# Patient Record
Sex: FEMALE | Race: WHITE | NOT HISPANIC OR LATINO | URBAN - METROPOLITAN AREA
[De-identification: names, ages, dates, MRNs, and addresses within clinical notes are randomized per-mention and may not be internally consistent; named-entity substitution may affect disease eponyms.]

---

## 2017-02-08 ENCOUNTER — EMERGENCY (EMERGENCY)
Facility: HOSPITAL | Age: 42
LOS: 1 days | End: 2017-02-08
Payer: MEDICAID

## 2017-02-08 PROBLEM — Z00.00 ENCOUNTER FOR PREVENTIVE HEALTH EXAMINATION: Status: ACTIVE | Noted: 2017-02-08

## 2017-02-08 PROCEDURE — 99285 EMERGENCY DEPT VISIT HI MDM: CPT

## 2017-02-08 PROCEDURE — 70450 CT HEAD/BRAIN W/O DYE: CPT | Mod: 26

## 2018-09-10 ENCOUNTER — RX RENEWAL (OUTPATIENT)
Age: 43
End: 2018-09-10

## 2018-09-27 ENCOUNTER — RX RENEWAL (OUTPATIENT)
Age: 43
End: 2018-09-27

## 2018-09-30 ENCOUNTER — RX RENEWAL (OUTPATIENT)
Age: 43
End: 2018-09-30

## 2019-01-16 ENCOUNTER — OTHER (OUTPATIENT)
Age: 44
End: 2019-01-16

## 2019-01-22 ENCOUNTER — RX CHANGE (OUTPATIENT)
Age: 44
End: 2019-01-22

## 2019-01-22 ENCOUNTER — MEDICATION RENEWAL (OUTPATIENT)
Age: 44
End: 2019-01-22

## 2019-03-28 ENCOUNTER — RECORD ABSTRACTING (OUTPATIENT)
Age: 44
End: 2019-03-28

## 2019-03-28 DIAGNOSIS — Z86.39 PERSONAL HISTORY OF OTHER ENDOCRINE, NUTRITIONAL AND METABOLIC DISEASE: ICD-10-CM

## 2019-03-28 DIAGNOSIS — Z80.9 FAMILY HISTORY OF MALIGNANT NEOPLASM, UNSPECIFIED: ICD-10-CM

## 2019-03-28 DIAGNOSIS — Z78.9 OTHER SPECIFIED HEALTH STATUS: ICD-10-CM

## 2019-03-28 DIAGNOSIS — Z83.3 FAMILY HISTORY OF DIABETES MELLITUS: ICD-10-CM

## 2019-03-28 RX ORDER — ALPRAZOLAM 0.5 MG/1
0.5 TABLET ORAL
Refills: 0 | Status: ACTIVE | COMMUNITY

## 2019-03-28 RX ORDER — OXYCODONE HYDROCHLORIDE AND ACETAMINOPHEN 5; 325 MG/1; MG/1
5-325 TABLET ORAL
Refills: 0 | Status: ACTIVE | COMMUNITY

## 2019-03-29 ENCOUNTER — APPOINTMENT (OUTPATIENT)
Dept: ENDOCRINOLOGY | Facility: CLINIC | Age: 44
End: 2019-03-29
Payer: MEDICAID

## 2019-03-29 VITALS
WEIGHT: 293 LBS | HEIGHT: 68 IN | OXYGEN SATURATION: 98 % | BODY MASS INDEX: 44.41 KG/M2 | DIASTOLIC BLOOD PRESSURE: 70 MMHG | SYSTOLIC BLOOD PRESSURE: 118 MMHG | HEART RATE: 89 BPM

## 2019-03-29 LAB — GLUCOSE BLDC GLUCOMTR-MCNC: 162

## 2019-03-29 PROCEDURE — 82962 GLUCOSE BLOOD TEST: CPT

## 2019-03-29 PROCEDURE — 99214 OFFICE O/P EST MOD 30 MIN: CPT | Mod: 25

## 2019-03-29 RX ORDER — INSULIN GLARGINE 300 U/ML
300 INJECTION, SOLUTION SUBCUTANEOUS
Qty: 3 | Refills: 3 | Status: DISCONTINUED | COMMUNITY
Start: 2018-09-30 | End: 2019-03-29

## 2019-03-29 RX ORDER — INSULIN DEGLUDEC INJECTION 100 U/ML
100 INJECTION, SOLUTION SUBCUTANEOUS
Qty: 2 | Refills: 2 | Status: DISCONTINUED | COMMUNITY
End: 2019-03-29

## 2019-03-29 NOTE — ASSESSMENT
[FreeTextEntry1] : DM type 1, unclear level of control. ADvised using dexcom to verify; will also update labs. Switch to basaglar per insurance change\par hyperlipidemia, on statin therapy\par hypertension controlled

## 2019-03-30 ENCOUNTER — RESULT REVIEW (OUTPATIENT)
Age: 44
End: 2019-03-30

## 2019-03-30 LAB
ALBUMIN SERPL ELPH-MCNC: 3.9 G/DL
ALP BLD-CCNC: 109 U/L
ALT SERPL-CCNC: 25 U/L
ANION GAP SERPL CALC-SCNC: 18 MMOL/L
AST SERPL-CCNC: 18 U/L
BASOPHILS # BLD AUTO: 0.03 K/UL
BASOPHILS NFR BLD AUTO: 0.5 %
BILIRUB SERPL-MCNC: 0.2 MG/DL
BUN SERPL-MCNC: 14 MG/DL
CALCIUM SERPL-MCNC: 9.5 MG/DL
CHLORIDE SERPL-SCNC: 104 MMOL/L
CHOLEST SERPL-MCNC: 183 MG/DL
CHOLEST/HDLC SERPL: 2.7 RATIO
CO2 SERPL-SCNC: 22 MMOL/L
CREAT SERPL-MCNC: 0.82 MG/DL
EOSINOPHIL # BLD AUTO: 0.14 K/UL
EOSINOPHIL NFR BLD AUTO: 2.1 %
ESTIMATED AVERAGE GLUCOSE: 203 MG/DL
GLUCOSE SERPL-MCNC: 132 MG/DL
HBA1C MFR BLD HPLC: 8.7 %
HCT VFR BLD CALC: 44 %
HDLC SERPL-MCNC: 67 MG/DL
HGB BLD-MCNC: 13.5 G/DL
IMM GRANULOCYTES NFR BLD AUTO: 0.2 %
LDLC SERPL CALC-MCNC: 97 MG/DL
LYMPHOCYTES # BLD AUTO: 2.01 K/UL
LYMPHOCYTES NFR BLD AUTO: 30.8 %
MAN DIFF?: NORMAL
MCHC RBC-ENTMCNC: 28.1 PG
MCHC RBC-ENTMCNC: 30.7 GM/DL
MCV RBC AUTO: 91.7 FL
MONOCYTES # BLD AUTO: 0.43 K/UL
MONOCYTES NFR BLD AUTO: 6.6 %
NEUTROPHILS # BLD AUTO: 3.91 K/UL
NEUTROPHILS NFR BLD AUTO: 59.8 %
PLATELET # BLD AUTO: 374 K/UL
POTASSIUM SERPL-SCNC: 4.4 MMOL/L
PROT SERPL-MCNC: 7.1 G/DL
RBC # BLD: 4.8 M/UL
RBC # FLD: 13.3 %
SODIUM SERPL-SCNC: 144 MMOL/L
TRIGL SERPL-MCNC: 93 MG/DL
TSH SERPL-ACNC: 1.26 UIU/ML
WBC # FLD AUTO: 6.53 K/UL

## 2019-08-09 ENCOUNTER — OTHER (OUTPATIENT)
Age: 44
End: 2019-08-09

## 2019-08-12 ENCOUNTER — APPOINTMENT (OUTPATIENT)
Dept: ENDOCRINOLOGY | Facility: CLINIC | Age: 44
End: 2019-08-12
Payer: MEDICAID

## 2019-08-12 PROCEDURE — 36415 COLL VENOUS BLD VENIPUNCTURE: CPT

## 2019-08-13 LAB
ALBUMIN SERPL ELPH-MCNC: 3.8 G/DL
ALP BLD-CCNC: 117 U/L
ALT SERPL-CCNC: 17 U/L
ANION GAP SERPL CALC-SCNC: 15 MMOL/L
APPEARANCE: CLEAR
AST SERPL-CCNC: 19 U/L
BASOPHILS # BLD AUTO: 0.02 K/UL
BASOPHILS NFR BLD AUTO: 0.3 %
BILIRUB SERPL-MCNC: 0.3 MG/DL
BILIRUBIN URINE: NEGATIVE
BLOOD URINE: NEGATIVE
BUN SERPL-MCNC: 11 MG/DL
CALCIUM SERPL-MCNC: 8.7 MG/DL
CHLORIDE SERPL-SCNC: 102 MMOL/L
CHOLEST SERPL-MCNC: 209 MG/DL
CHOLEST/HDLC SERPL: 3.7 RATIO
CO2 SERPL-SCNC: 22 MMOL/L
COLOR: NORMAL
CREAT SERPL-MCNC: 0.66 MG/DL
CREAT SPEC-SCNC: 111 MG/DL
EOSINOPHIL # BLD AUTO: 0.17 K/UL
EOSINOPHIL NFR BLD AUTO: 2.6 %
ESTIMATED AVERAGE GLUCOSE: 183 MG/DL
GLUCOSE QUALITATIVE U: ABNORMAL
GLUCOSE SERPL-MCNC: 143 MG/DL
HBA1C MFR BLD HPLC: 8 %
HCT VFR BLD CALC: 39.9 %
HDLC SERPL-MCNC: 56 MG/DL
HGB BLD-MCNC: 12.6 G/DL
IMM GRANULOCYTES NFR BLD AUTO: 0.2 %
KETONES URINE: NEGATIVE
LDLC SERPL CALC-MCNC: 126 MG/DL
LEUKOCYTE ESTERASE URINE: NEGATIVE
LYMPHOCYTES # BLD AUTO: 2.05 K/UL
LYMPHOCYTES NFR BLD AUTO: 31.6 %
MAN DIFF?: NORMAL
MCHC RBC-ENTMCNC: 28.6 PG
MCHC RBC-ENTMCNC: 31.6 GM/DL
MCV RBC AUTO: 90.7 FL
MICROALBUMIN 24H UR DL<=1MG/L-MCNC: <1.2 MG/DL
MICROALBUMIN/CREAT 24H UR-RTO: NORMAL MG/G
MONOCYTES # BLD AUTO: 0.48 K/UL
MONOCYTES NFR BLD AUTO: 7.4 %
NEUTROPHILS # BLD AUTO: 3.76 K/UL
NEUTROPHILS NFR BLD AUTO: 57.9 %
NITRITE URINE: NEGATIVE
PH URINE: 6
PLATELET # BLD AUTO: 344 K/UL
POTASSIUM SERPL-SCNC: 4.2 MMOL/L
PROT SERPL-MCNC: 6.8 G/DL
PROTEIN URINE: NEGATIVE
RBC # BLD: 4.4 M/UL
RBC # FLD: 12.5 %
SODIUM SERPL-SCNC: 139 MMOL/L
SPECIFIC GRAVITY URINE: 1.02
TRIGL SERPL-MCNC: 133 MG/DL
TSH SERPL-ACNC: 2.42 UIU/ML
UROBILINOGEN URINE: NORMAL
WBC # FLD AUTO: 6.49 K/UL

## 2019-08-15 ENCOUNTER — OTHER (OUTPATIENT)
Age: 44
End: 2019-08-15

## 2019-08-16 ENCOUNTER — APPOINTMENT (OUTPATIENT)
Dept: ENDOCRINOLOGY | Facility: CLINIC | Age: 44
End: 2019-08-16
Payer: MEDICAID

## 2019-08-16 PROCEDURE — G0108 DIAB MANAGE TRN  PER INDIV: CPT

## 2019-08-16 RX ORDER — INSULIN LISPRO 100 [IU]/ML
100 INJECTION, SOLUTION INTRAVENOUS; SUBCUTANEOUS
Refills: 0 | Status: DISCONTINUED | COMMUNITY
End: 2019-08-16

## 2019-08-28 ENCOUNTER — APPOINTMENT (OUTPATIENT)
Dept: ENDOCRINOLOGY | Facility: CLINIC | Age: 44
End: 2019-08-28
Payer: MEDICAID

## 2019-08-28 VITALS
HEIGHT: 68 IN | DIASTOLIC BLOOD PRESSURE: 70 MMHG | WEIGHT: 293 LBS | SYSTOLIC BLOOD PRESSURE: 122 MMHG | BODY MASS INDEX: 44.41 KG/M2 | HEART RATE: 95 BPM

## 2019-08-28 PROCEDURE — 99214 OFFICE O/P EST MOD 30 MIN: CPT

## 2019-08-28 NOTE — HISTORY OF PRESENT ILLNESS
[FreeTextEntry1] : h/o diabetes presenting at age 17 with polyuria and polydipsia. ALways insulin treated. Overweight at onset, with no h/o DKA. Progressive weight gain.\par ISABELLA positive, c-peptide negative\par has dexcom, not used recently\par basaglar 30-35 units\par admelog variable dosing 100 units/day\par uses vials\par Lap band ineffective and was removed\par victoza ineffective\par qsymia not covered by insurance\par \par brother and sister diabetic with onset in teenage years, not obese.\par \par PMH:\par small incidental thyroid nodule\par \par scheduled for liposuction 9/12

## 2019-08-28 NOTE — ASSESSMENT
[FreeTextEntry1] : DM type 1, improving control. Rise in glucose sometimes many hours after carb ingestion; improving with decreaed carb intake, all verified by dexcom download.\par hyperlipidemia, on statin therapy\par hypertension controlled\par \par In terms of surgery, this is to certify that I am the physician treating pt's diabetes, and she is medically clear for surgery.

## 2019-09-19 ENCOUNTER — EMERGENCY (EMERGENCY)
Facility: HOSPITAL | Age: 44
LOS: 1 days | End: 2019-09-19
Admitting: EMERGENCY MEDICINE
Payer: MEDICAID

## 2019-09-19 PROCEDURE — 99283 EMERGENCY DEPT VISIT LOW MDM: CPT

## 2019-09-19 PROCEDURE — 74019 RADEX ABDOMEN 2 VIEWS: CPT | Mod: 26

## 2019-10-03 ENCOUNTER — RX RENEWAL (OUTPATIENT)
Age: 44
End: 2019-10-03

## 2019-10-08 ENCOUNTER — APPOINTMENT (OUTPATIENT)
Dept: OPHTHALMOLOGY | Facility: CLINIC | Age: 44
End: 2019-10-08

## 2019-10-24 ENCOUNTER — APPOINTMENT (OUTPATIENT)
Dept: OPHTHALMOLOGY | Facility: CLINIC | Age: 44
End: 2019-10-24

## 2019-11-12 ENCOUNTER — APPOINTMENT (OUTPATIENT)
Dept: ENDOCRINOLOGY | Facility: CLINIC | Age: 44
End: 2019-11-12
Payer: MEDICAID

## 2019-11-12 PROCEDURE — 36415 COLL VENOUS BLD VENIPUNCTURE: CPT

## 2019-11-13 LAB
ALBUMIN SERPL ELPH-MCNC: 3.6 G/DL
ALP BLD-CCNC: 97 U/L
ALT SERPL-CCNC: 13 U/L
ANION GAP SERPL CALC-SCNC: 11 MMOL/L
AST SERPL-CCNC: 17 U/L
BASOPHILS # BLD AUTO: 0.03 K/UL
BASOPHILS NFR BLD AUTO: 0.4 %
BILIRUB SERPL-MCNC: 0.2 MG/DL
BUN SERPL-MCNC: 14 MG/DL
CALCIUM SERPL-MCNC: 8.7 MG/DL
CHLORIDE SERPL-SCNC: 104 MMOL/L
CHOLEST SERPL-MCNC: 200 MG/DL
CHOLEST/HDLC SERPL: 3.3 RATIO
CO2 SERPL-SCNC: 25 MMOL/L
CREAT SERPL-MCNC: 0.69 MG/DL
CREAT SPEC-SCNC: 139 MG/DL
EOSINOPHIL # BLD AUTO: 0.25 K/UL
EOSINOPHIL NFR BLD AUTO: 3.5 %
ESTIMATED AVERAGE GLUCOSE: 163 MG/DL
GLUCOSE SERPL-MCNC: 123 MG/DL
HBA1C MFR BLD HPLC: 7.3 %
HCT VFR BLD CALC: 36.7 %
HDLC SERPL-MCNC: 61 MG/DL
HGB BLD-MCNC: 10.6 G/DL
IMM GRANULOCYTES NFR BLD AUTO: 0.1 %
LDLC SERPL CALC-MCNC: 112 MG/DL
LYMPHOCYTES # BLD AUTO: 1.88 K/UL
LYMPHOCYTES NFR BLD AUTO: 26.5 %
MAN DIFF?: NORMAL
MCHC RBC-ENTMCNC: 25.4 PG
MCHC RBC-ENTMCNC: 28.9 GM/DL
MCV RBC AUTO: 88 FL
MICROALBUMIN 24H UR DL<=1MG/L-MCNC: <1.2 MG/DL
MICROALBUMIN/CREAT 24H UR-RTO: NORMAL MG/G
MONOCYTES # BLD AUTO: 0.64 K/UL
MONOCYTES NFR BLD AUTO: 9 %
NEUTROPHILS # BLD AUTO: 4.28 K/UL
NEUTROPHILS NFR BLD AUTO: 60.5 %
PLATELET # BLD AUTO: 430 K/UL
POTASSIUM SERPL-SCNC: 4.5 MMOL/L
PROT SERPL-MCNC: 6.5 G/DL
RBC # BLD: 4.17 M/UL
RBC # FLD: 13.9 %
SODIUM SERPL-SCNC: 140 MMOL/L
TRIGL SERPL-MCNC: 137 MG/DL
TSH SERPL-ACNC: 1.37 UIU/ML
WBC # FLD AUTO: 7.09 K/UL

## 2019-11-14 ENCOUNTER — APPOINTMENT (OUTPATIENT)
Dept: ENDOCRINOLOGY | Facility: CLINIC | Age: 44
End: 2019-11-14
Payer: COMMERCIAL

## 2019-11-14 VITALS
DIASTOLIC BLOOD PRESSURE: 80 MMHG | WEIGHT: 293 LBS | SYSTOLIC BLOOD PRESSURE: 124 MMHG | BODY MASS INDEX: 44.41 KG/M2 | HEART RATE: 92 BPM | HEIGHT: 68 IN | OXYGEN SATURATION: 98 %

## 2019-11-14 LAB — GLUCOSE BLDC GLUCOMTR-MCNC: 178

## 2019-11-14 PROCEDURE — 99214 OFFICE O/P EST MOD 30 MIN: CPT | Mod: 25

## 2019-11-14 PROCEDURE — 82962 GLUCOSE BLOOD TEST: CPT

## 2019-11-14 NOTE — PHYSICAL EXAM
[Well Developed] : well developed [Clear to Auscultation] : lungs were clear to auscultation bilaterally [Regular Rhythm] : with a regular rhythm

## 2019-11-14 NOTE — HISTORY OF PRESENT ILLNESS
[FreeTextEntry1] : h/o diabetes presenting at age 17 with polyuria and polydipsia. ALways insulin treated. Overweight at onset, with no h/o DKA. Progressive weight gain.\par ISABELLA positive, c-peptide negative\par has dexcom, not used recently\par basaglar 30-35 units\par admelog variable dosing 100 units/day\par uses vials\par Lap band ineffective and was removed\par victoza ineffective\par qsymia not covered by insurance\par \par brother and sister diabetic with onset in teenage years, not obese.\par \par PMH:\par small incidental thyroid nodule\par \par s/p liposuction

## 2019-11-14 NOTE — ASSESSMENT
[FreeTextEntry1] : DM type one, overall controlled but with erratic overnight highs.. Unclear if this is due to post-supper snack or due to a requirement for more basal insulin. Pt. will provide more data and logs to review.\par \par Medically clear for surgery

## 2020-03-17 ENCOUNTER — APPOINTMENT (OUTPATIENT)
Dept: ENDOCRINOLOGY | Facility: CLINIC | Age: 45
End: 2020-03-17

## 2020-03-20 ENCOUNTER — APPOINTMENT (OUTPATIENT)
Dept: ENDOCRINOLOGY | Facility: CLINIC | Age: 45
End: 2020-03-20

## 2020-04-03 ENCOUNTER — APPOINTMENT (OUTPATIENT)
Dept: ENDOCRINOLOGY | Facility: CLINIC | Age: 45
End: 2020-04-03

## 2020-05-05 ENCOUNTER — APPOINTMENT (OUTPATIENT)
Dept: ENDOCRINOLOGY | Facility: CLINIC | Age: 45
End: 2020-05-05
Payer: MEDICAID

## 2020-05-05 PROCEDURE — 36415 COLL VENOUS BLD VENIPUNCTURE: CPT

## 2020-05-06 LAB
ALBUMIN SERPL ELPH-MCNC: 3.9 G/DL
ALP BLD-CCNC: 131 U/L
ALT SERPL-CCNC: 16 U/L
ANION GAP SERPL CALC-SCNC: 12 MMOL/L
AST SERPL-CCNC: 13 U/L
BASOPHILS # BLD AUTO: 0.04 K/UL
BASOPHILS NFR BLD AUTO: 0.6 %
BILIRUB SERPL-MCNC: 0.3 MG/DL
BUN SERPL-MCNC: 11 MG/DL
CALCIUM SERPL-MCNC: 9.2 MG/DL
CHLORIDE SERPL-SCNC: 101 MMOL/L
CHOLEST SERPL-MCNC: 205 MG/DL
CHOLEST/HDLC SERPL: 3 RATIO
CO2 SERPL-SCNC: 26 MMOL/L
CREAT SERPL-MCNC: 0.65 MG/DL
CREAT SPEC-SCNC: 274 MG/DL
EOSINOPHIL # BLD AUTO: 0.15 K/UL
EOSINOPHIL NFR BLD AUTO: 2.1 %
ESTIMATED AVERAGE GLUCOSE: 206 MG/DL
GLUCOSE SERPL-MCNC: 85 MG/DL
HBA1C MFR BLD HPLC: 8.8 %
HCT VFR BLD CALC: 39.2 %
HDLC SERPL-MCNC: 68 MG/DL
HGB BLD-MCNC: 12 G/DL
IMM GRANULOCYTES NFR BLD AUTO: 0.1 %
LDLC SERPL CALC-MCNC: 109 MG/DL
LYMPHOCYTES # BLD AUTO: 2.34 K/UL
LYMPHOCYTES NFR BLD AUTO: 32.2 %
MAN DIFF?: NORMAL
MCHC RBC-ENTMCNC: 25.8 PG
MCHC RBC-ENTMCNC: 30.6 GM/DL
MCV RBC AUTO: 84.3 FL
MICROALBUMIN 24H UR DL<=1MG/L-MCNC: 1.3 MG/DL
MICROALBUMIN/CREAT 24H UR-RTO: 5 MG/G
MONOCYTES # BLD AUTO: 0.7 K/UL
MONOCYTES NFR BLD AUTO: 9.6 %
NEUTROPHILS # BLD AUTO: 4.02 K/UL
NEUTROPHILS NFR BLD AUTO: 55.4 %
PLATELET # BLD AUTO: 423 K/UL
POTASSIUM SERPL-SCNC: 4.1 MMOL/L
PROT SERPL-MCNC: 7.2 G/DL
RBC # BLD: 4.65 M/UL
RBC # FLD: 17.3 %
SODIUM SERPL-SCNC: 138 MMOL/L
TRIGL SERPL-MCNC: 143 MG/DL
TSH SERPL-ACNC: 1.83 UIU/ML
WBC # FLD AUTO: 7.26 K/UL

## 2020-05-08 ENCOUNTER — APPOINTMENT (OUTPATIENT)
Dept: ENDOCRINOLOGY | Facility: CLINIC | Age: 45
End: 2020-05-08
Payer: MEDICAID

## 2020-05-08 PROCEDURE — 99214 OFFICE O/P EST MOD 30 MIN: CPT | Mod: 95

## 2020-05-08 NOTE — HISTORY OF PRESENT ILLNESS
[Home] : at home, [unfilled] , at the time of the visit. [Medical Office: ([facility])___] : at the medical office located in  [Patient] : the patient [FreeTextEntry1] : h/o diabetes presenting at age 17 with polyuria and polydipsia. ALways insulin treated. Overweight at onset, with no h/o DKA. Progressive weight gain.\par ISABELLA positive, c-peptide negative\par has dexcom, not used recently\par basaglar 30-35 units\par admelog variable dosing 100 units/day\par uses vials\par Lap band ineffective and was removed\par victoza ineffective\par qsymia not covered by insurance\par \par brother and sister diabetic with onset in teenage years, not obese.\par \par PMH:\par small incidental thyroid nodule\par \par s/p liposuction

## 2020-05-08 NOTE — ASSESSMENT
[FreeTextEntry1] : DM type 1, loss of control. Advised resumption of dexcom use, to be followed by review of data.\par Hyperlipidemia, on therapy\par \par meds renewed

## 2020-05-08 NOTE — PHYSICAL EXAM
[Alert] : alert [No Acute Distress] : no acute distress [Normal Affect] : the affect was normal [Normal Mood] : the mood was normal

## 2020-08-20 ENCOUNTER — RX RENEWAL (OUTPATIENT)
Age: 45
End: 2020-08-20

## 2020-09-21 ENCOUNTER — APPOINTMENT (OUTPATIENT)
Dept: ENDOCRINOLOGY | Facility: CLINIC | Age: 45
End: 2020-09-21
Payer: MEDICAID

## 2020-09-21 PROCEDURE — 36415 COLL VENOUS BLD VENIPUNCTURE: CPT

## 2020-09-22 LAB
ALBUMIN SERPL ELPH-MCNC: 4 G/DL
ALP BLD-CCNC: 143 U/L
ALT SERPL-CCNC: 18 U/L
ANION GAP SERPL CALC-SCNC: 11 MMOL/L
AST SERPL-CCNC: 18 U/L
BASOPHILS # BLD AUTO: 0.04 K/UL
BASOPHILS NFR BLD AUTO: 0.7 %
BILIRUB SERPL-MCNC: 0.2 MG/DL
BUN SERPL-MCNC: 9 MG/DL
CALCIUM SERPL-MCNC: 9 MG/DL
CHLORIDE SERPL-SCNC: 99 MMOL/L
CHOLEST SERPL-MCNC: 208 MG/DL
CHOLEST/HDLC SERPL: 3.3 RATIO
CO2 SERPL-SCNC: 26 MMOL/L
CREAT SERPL-MCNC: 0.65 MG/DL
CREAT SPEC-SCNC: 115 MG/DL
EOSINOPHIL # BLD AUTO: 0.17 K/UL
EOSINOPHIL NFR BLD AUTO: 2.9 %
ESTIMATED AVERAGE GLUCOSE: 197 MG/DL
GLUCOSE SERPL-MCNC: 155 MG/DL
HBA1C MFR BLD HPLC: 8.5 %
HCT VFR BLD CALC: 39.9 %
HDLC SERPL-MCNC: 63 MG/DL
HGB BLD-MCNC: 12.1 G/DL
IMM GRANULOCYTES NFR BLD AUTO: 0.2 %
LDLC SERPL CALC-MCNC: 121 MG/DL
LYMPHOCYTES # BLD AUTO: 1.87 K/UL
LYMPHOCYTES NFR BLD AUTO: 32 %
MAN DIFF?: NORMAL
MCHC RBC-ENTMCNC: 27.4 PG
MCHC RBC-ENTMCNC: 30.3 GM/DL
MCV RBC AUTO: 90.5 FL
MICROALBUMIN 24H UR DL<=1MG/L-MCNC: <1.2 MG/DL
MICROALBUMIN/CREAT 24H UR-RTO: NORMAL MG/G
MONOCYTES # BLD AUTO: 0.49 K/UL
MONOCYTES NFR BLD AUTO: 8.4 %
NEUTROPHILS # BLD AUTO: 3.27 K/UL
NEUTROPHILS NFR BLD AUTO: 55.8 %
PLATELET # BLD AUTO: 353 K/UL
POTASSIUM SERPL-SCNC: 4.2 MMOL/L
PROT SERPL-MCNC: 6.8 G/DL
RBC # BLD: 4.41 M/UL
RBC # FLD: 14.4 %
SODIUM SERPL-SCNC: 137 MMOL/L
TRIGL SERPL-MCNC: 120 MG/DL
TSH SERPL-ACNC: 0.84 UIU/ML
WBC # FLD AUTO: 5.85 K/UL

## 2020-09-25 ENCOUNTER — APPOINTMENT (OUTPATIENT)
Dept: ENDOCRINOLOGY | Facility: CLINIC | Age: 45
End: 2020-09-25
Payer: MEDICAID

## 2020-09-25 VITALS
BODY MASS INDEX: 44.41 KG/M2 | WEIGHT: 293 LBS | HEART RATE: 81 BPM | HEIGHT: 68 IN | DIASTOLIC BLOOD PRESSURE: 80 MMHG | SYSTOLIC BLOOD PRESSURE: 132 MMHG

## 2020-09-25 LAB — GLUCOSE BLDC GLUCOMTR-MCNC: 155

## 2020-09-25 PROCEDURE — 82962 GLUCOSE BLOOD TEST: CPT

## 2020-09-25 PROCEDURE — 99215 OFFICE O/P EST HI 40 MIN: CPT | Mod: 25

## 2020-09-25 NOTE — HISTORY OF PRESENT ILLNESS
[FreeTextEntry1] : h/o diabetes presenting at age 17 with polyuria and polydipsia. ALways insulin treated. Overweight at onset, with no h/o DKA. Progressive weight gain.\par ISABELLA positive, c-peptide negative\par has dexcom, not used recently\par basaglar 35 units\par admelog variable dosing 100 units/day 1:10 CF\par uses vials\par Lap band ineffective and was removed\par victoza ineffective\par qsymia not covered by insurance\par \par brother and sister diabetic with onset in teenage years, not obese.\par \par PMH:\par small incidental thyroid nodule\par \par s/p liposuction

## 2020-09-25 NOTE — ASSESSMENT
[FreeTextEntry1] : DM type 1, suboptimal control. needs to resume using dexcom. Counseled on pump options. Pt. has difficulty regulating overnight hyperglycemia and raising her basal insulin tends to cause daytime hypoglycemia; would benefit from automated insulin delivery. \par Will follow up with CDE.\par \par counseling and discussion of pump advantages and disadvantages, and review of lab data 25/40 minutes

## 2020-11-13 ENCOUNTER — APPOINTMENT (OUTPATIENT)
Dept: ENDOCRINOLOGY | Facility: CLINIC | Age: 45
End: 2020-11-13

## 2021-01-05 ENCOUNTER — APPOINTMENT (OUTPATIENT)
Dept: ENDOCRINOLOGY | Facility: CLINIC | Age: 46
End: 2021-01-05

## 2021-01-15 ENCOUNTER — APPOINTMENT (OUTPATIENT)
Dept: ENDOCRINOLOGY | Facility: CLINIC | Age: 46
End: 2021-01-15

## 2021-03-29 ENCOUNTER — APPOINTMENT (OUTPATIENT)
Dept: ENDOCRINOLOGY | Facility: CLINIC | Age: 46
End: 2021-03-29
Payer: MEDICAID

## 2021-03-29 VITALS
WEIGHT: 293 LBS | OXYGEN SATURATION: 97 % | HEIGHT: 68 IN | HEART RATE: 97 BPM | SYSTOLIC BLOOD PRESSURE: 130 MMHG | DIASTOLIC BLOOD PRESSURE: 80 MMHG | BODY MASS INDEX: 44.41 KG/M2

## 2021-03-29 LAB — GLUCOSE BLDC GLUCOMTR-MCNC: 157

## 2021-03-29 PROCEDURE — 99072 ADDL SUPL MATRL&STAF TM PHE: CPT

## 2021-03-29 PROCEDURE — 82962 GLUCOSE BLOOD TEST: CPT

## 2021-03-29 PROCEDURE — 99214 OFFICE O/P EST MOD 30 MIN: CPT | Mod: 25

## 2021-03-29 RX ORDER — BLOOD-GLUCOSE TRANSMITTER
EACH MISCELLANEOUS
Qty: 1 | Refills: 3 | Status: ACTIVE | COMMUNITY
Start: 2019-08-16 | End: 1900-01-01

## 2021-03-29 RX ORDER — BLOOD-GLUCOSE SENSOR
EACH MISCELLANEOUS
Qty: 3 | Refills: 3 | Status: ACTIVE | COMMUNITY
Start: 2019-08-16 | End: 1900-01-01

## 2021-03-29 NOTE — HISTORY OF PRESENT ILLNESS
[FreeTextEntry1] : h/o diabetes presenting at age 17 with polyuria and polydipsia. ALways insulin treated. Overweight at onset, with no h/o DKA. Progressive weight gain.\par ISABELLA positive, c-peptide negative\par has dexcom, not used recently\par semglee 35 units\par humalog variable dosing 100 units/day 1:10 CF\par uses vials\par Lap band ineffective and was removed\par victoza ineffective\par qsymia not covered by insurance\par \par brother and sister diabetic with onset in teenage years, not obese.\par \par PMH:\par small incidental thyroid nodule\par \par s/p liposuction

## 2021-03-29 NOTE — ASSESSMENT
[FreeTextEntry1] : DM type 1, suboptimal control. needs to resume using dexcom.\par discussed hyperlipiemia\par update labs\par renew dexcom\par \par Glucose Sensor Necessity:  This patient with diabetes (dx: E10.65 ) has been performing 4 glucose checks per day for the last 60 days utilizing a home blood glucose monitor  The patient is treated with insulin via 4 injections daily.  This patient requires frequent adjustments to her insulin treatment on the basis of therapeutic continuous glucose monitoring results by either adjusting fixed doses or sliding scale.  In addition, the patient has been to our office for an evaluation of their diabetes control within the past 6 months.  \par \par \par

## 2021-07-21 ENCOUNTER — RX RENEWAL (OUTPATIENT)
Age: 46
End: 2021-07-21

## 2021-10-02 ENCOUNTER — RX RENEWAL (OUTPATIENT)
Age: 46
End: 2021-10-02

## 2021-10-15 ENCOUNTER — RX RENEWAL (OUTPATIENT)
Age: 46
End: 2021-10-15

## 2021-10-15 RX ORDER — BLOOD SUGAR DIAGNOSTIC
STRIP MISCELLANEOUS
Qty: 600 | Refills: 4 | Status: ACTIVE | COMMUNITY
Start: 2020-08-20 | End: 1900-01-01

## 2022-01-16 ENCOUNTER — RX RENEWAL (OUTPATIENT)
Age: 47
End: 2022-01-16

## 2022-01-26 ENCOUNTER — TRANSCRIPTION ENCOUNTER (OUTPATIENT)
Age: 47
End: 2022-01-26

## 2022-01-27 ENCOUNTER — TRANSCRIPTION ENCOUNTER (OUTPATIENT)
Age: 47
End: 2022-01-27

## 2022-01-28 ENCOUNTER — TRANSCRIPTION ENCOUNTER (OUTPATIENT)
Age: 47
End: 2022-01-28

## 2022-04-20 ENCOUNTER — RX RENEWAL (OUTPATIENT)
Age: 47
End: 2022-04-20

## 2022-07-13 LAB
HBA1C MFR BLD HPLC: 9.3
LDLC SERPL DIRECT ASSAY-MCNC: 123
MICROALBUMIN/CREAT 24H UR-RTO: NORMAL

## 2022-07-14 ENCOUNTER — APPOINTMENT (OUTPATIENT)
Dept: ENDOCRINOLOGY | Facility: CLINIC | Age: 47
End: 2022-07-14

## 2022-07-14 VITALS
BODY MASS INDEX: 44.41 KG/M2 | HEART RATE: 117 BPM | WEIGHT: 293 LBS | HEIGHT: 68 IN | OXYGEN SATURATION: 97 % | DIASTOLIC BLOOD PRESSURE: 80 MMHG | SYSTOLIC BLOOD PRESSURE: 120 MMHG

## 2022-07-14 LAB — GLUCOSE BLDC GLUCOMTR-MCNC: 262

## 2022-07-14 PROCEDURE — 82962 GLUCOSE BLOOD TEST: CPT

## 2022-07-14 PROCEDURE — 99214 OFFICE O/P EST MOD 30 MIN: CPT | Mod: 25

## 2022-07-14 NOTE — ASSESSMENT
[FreeTextEntry1] : DM type 1, suboptimal control. needs to resume using dexcom. Empirically increase semglee to 45 units due to reports of am hyperglycemia\par hyperlipidemia controlled\par \par \par Glucose Sensor Necessity:  This patient with diabetes (dx: E10.65 ) has been performing 4 glucose checks per day for the last 60 days utilizing a home blood glucose monitor  The patient is treated with insulin via 4 injections daily.  This patient requires frequent adjustments to her insulin treatment on the basis of therapeutic continuous glucose monitoring results by either adjusting fixed doses or sliding scale.  In addition, the patient has been to our office for an evaluation of their diabetes control within the past 6 months.  \par \par \par

## 2022-07-14 NOTE — HISTORY OF PRESENT ILLNESS
[FreeTextEntry1] : h/o diabetes presenting at age 17 with polyuria and polydipsia. ALways insulin treated. Overweight at onset, with no h/o DKA. Progressive weight gain.\par ISABELLA positive, c-peptide negative\par has dexcom, not used recently\par semglee 35 units\par humalog variable dosing 100 units/day 1:10 CF\par uses vials\par Lap band ineffective and was removed\par victoza ineffective\par qsymia not covered by insurance\par \par brother and sister diabetic with onset in teenage years, not obese.\par \par PMH:\par small incidental thyroid nodule\par elevated alkaline phos., improving\par \par s/p liposuction

## 2022-08-09 ENCOUNTER — APPOINTMENT (OUTPATIENT)
Dept: ENDOCRINOLOGY | Facility: CLINIC | Age: 47
End: 2022-08-09

## 2022-11-08 ENCOUNTER — NON-APPOINTMENT (OUTPATIENT)
Age: 47
End: 2022-11-08

## 2022-12-19 ENCOUNTER — APPOINTMENT (OUTPATIENT)
Dept: ENDOCRINOLOGY | Facility: CLINIC | Age: 47
End: 2022-12-19

## 2022-12-20 ENCOUNTER — NON-APPOINTMENT (OUTPATIENT)
Age: 47
End: 2022-12-20

## 2023-04-03 ENCOUNTER — RX RENEWAL (OUTPATIENT)
Age: 48
End: 2023-04-03

## 2023-05-02 ENCOUNTER — RX RENEWAL (OUTPATIENT)
Age: 48
End: 2023-05-02

## 2023-05-02 RX ORDER — BLOOD SUGAR DIAGNOSTIC
STRIP MISCELLANEOUS
Qty: 600 | Refills: 7 | Status: ACTIVE | COMMUNITY
Start: 2022-07-14 | End: 1900-01-01

## 2023-06-09 RX ORDER — INSULIN LISPRO 100 U/ML
100 INJECTION, SOLUTION INTRAVENOUS; SUBCUTANEOUS
Qty: 30 | Refills: 6 | Status: DISCONTINUED | COMMUNITY
Start: 2022-01-16 | End: 2023-06-09

## 2023-06-21 RX ORDER — INSULIN LISPRO 100 [IU]/ML
100 INJECTION, SOLUTION INTRAVENOUS; SUBCUTANEOUS
Qty: 90 | Refills: 3 | Status: ACTIVE | COMMUNITY
Start: 2023-06-21

## 2023-08-17 ENCOUNTER — APPOINTMENT (OUTPATIENT)
Dept: ENDOCRINOLOGY | Facility: CLINIC | Age: 48
End: 2023-08-17
Payer: MEDICAID

## 2023-08-17 VITALS
DIASTOLIC BLOOD PRESSURE: 80 MMHG | HEART RATE: 113 BPM | OXYGEN SATURATION: 98 % | HEIGHT: 68 IN | SYSTOLIC BLOOD PRESSURE: 130 MMHG

## 2023-08-17 LAB — GLUCOSE BLDC GLUCOMTR-MCNC: 220

## 2023-08-17 PROCEDURE — 82962 GLUCOSE BLOOD TEST: CPT

## 2023-08-17 PROCEDURE — 99214 OFFICE O/P EST MOD 30 MIN: CPT | Mod: 25

## 2023-08-17 RX ORDER — INSULIN LISPRO 100 [IU]/ML
100 INJECTION, SOLUTION INTRAVENOUS; SUBCUTANEOUS
Qty: 60 | Refills: 3 | Status: DISCONTINUED | COMMUNITY
Start: 2019-10-03 | End: 2023-08-17

## 2023-08-17 RX ORDER — DULAGLUTIDE 0.75 MG/.5ML
0.75 INJECTION, SOLUTION SUBCUTANEOUS
Qty: 3 | Refills: 1 | Status: DISCONTINUED | COMMUNITY
Start: 2022-09-27 | End: 2023-08-17

## 2023-08-17 RX ORDER — INSULIN LISPRO 100 [IU]/ML
100 INJECTION, SOLUTION INTRAVENOUS; SUBCUTANEOUS
Qty: 30 | Refills: 5 | Status: DISCONTINUED | COMMUNITY
Start: 2022-07-14 | End: 2023-08-17

## 2023-08-17 RX ORDER — INSULIN LISPRO 100 [IU]/ML
100 INJECTION, SOLUTION INTRAVENOUS; SUBCUTANEOUS
Qty: 10 | Refills: 1 | Status: ACTIVE | COMMUNITY
Start: 2023-07-19 | End: 1900-01-01

## 2023-08-17 RX ORDER — BLOOD SUGAR DIAGNOSTIC
STRIP MISCELLANEOUS
Qty: 300 | Refills: 3 | Status: ACTIVE | COMMUNITY
Start: 2018-09-30 | End: 1900-01-01

## 2023-08-17 RX ORDER — SYRINGE-NEEDLE,INSULIN,0.5 ML 31 GX5/16"
31G X 5/16" SYRINGE, EMPTY DISPOSABLE MISCELLANEOUS
Qty: 60 | Refills: 3 | Status: ACTIVE | COMMUNITY
Start: 2019-03-29 | End: 1900-01-01

## 2023-08-17 RX ORDER — PEN NEEDLE, DIABETIC 29 G X1/2"
32G X 4 MM NEEDLE, DISPOSABLE MISCELLANEOUS
Qty: 100 | Refills: 3 | Status: ACTIVE | COMMUNITY
Start: 2020-05-08 | End: 1900-01-01

## 2023-08-17 RX ORDER — INSULIN GLARGINE-YFGN 100 [IU]/ML
100 INJECTION, SOLUTION SUBCUTANEOUS
Qty: 3 | Refills: 3 | Status: DISCONTINUED | COMMUNITY
Start: 2022-07-14 | End: 2023-08-17

## 2023-08-17 NOTE — HISTORY OF PRESENT ILLNESS
[FreeTextEntry1] : Pt lost to follow up since 2022  h/o diabetes presenting at age 17 with polyuria and polydipsia. ALways insulin treated. Overweight at onset, with no h/o DKA. Progressive weight gain.  ISABELLA positive, c-peptide negative  has dexcom, not used recently Checks sugars 6x a day On average 100s-200s, rare 300s Denies hypoglycemia   Current regimen:  Basaglar 60 units in PM (pen) humalog variable dosing 100 units/day 1:10 CF (vials)  Admelog does not work well for pt  Lap band ineffective and was removed Had a tummy tuck  victoza ineffective qsymia not covered by insurance on ozempic for several weeks- stopped peeing and then stopped taking ozempic and her urinary frequency resumed  brother and sister diabetic with onset in teenage years, not obese.  PMH:  small incidental thyroid nodule  On statin and zetia - followed by cardiology

## 2023-08-17 NOTE — REVIEW OF SYSTEMS
[Fatigue] : no fatigue [Visual Field Defect] : no visual field defect [Blurred Vision] : no blurred vision [Dysphagia] : no dysphagia [Neck Pain] : no neck pain [Dysphonia] : no dysphonia [Chest Pain] : no chest pain [Shortness Of Breath] : no shortness of breath [Constipation] : no constipation [Diarrhea] : no diarrhea [Polyuria] : no polyuria [Polydipsia] : no polydipsia

## 2023-08-17 NOTE — ASSESSMENT
[FreeTextEntry1] : T1DM -Need updated HGA1C -Pt to resume dexcom- will see if g7 is covered, if no, she will resume g6 -Continue current insulin dosing -Continue to watch diet and increase exercise as tolerated, goal of 30 mins a day 5x a week -Continue to follow with ophtho   HLD -Continue statin and zetia, need updated lipid panel -Follows with cardiology  Need updated thyroid sonogram, rx givne  Labs due now RTO 12/2023 with Dr. Collado

## 2023-08-22 RX ORDER — INSULIN GLARGINE 100 [IU]/ML
100 INJECTION, SOLUTION SUBCUTANEOUS DAILY
Qty: 8 | Refills: 2 | Status: DISCONTINUED | COMMUNITY
Start: 2019-03-29 | End: 2023-08-22

## 2023-09-20 ENCOUNTER — NON-APPOINTMENT (OUTPATIENT)
Age: 48
End: 2023-09-20

## 2023-09-20 ENCOUNTER — APPOINTMENT (OUTPATIENT)
Dept: OPHTHALMOLOGY | Facility: CLINIC | Age: 48
End: 2023-09-20
Payer: MEDICAID

## 2023-09-20 PROCEDURE — 99204 OFFICE O/P NEW MOD 45 MIN: CPT

## 2023-11-21 RX ORDER — INSULIN GLARGINE-YFGN 100 [IU]/ML
100 INJECTION, SOLUTION SUBCUTANEOUS
Qty: 6 | Refills: 0 | Status: DISCONTINUED | COMMUNITY
Start: 2023-08-22 | End: 2023-11-21

## 2023-11-27 RX ORDER — INSULIN GLARGINE 100 [IU]/ML
100 INJECTION, SOLUTION SUBCUTANEOUS DAILY
Qty: 6 | Refills: 1 | Status: ACTIVE | COMMUNITY
Start: 2023-11-21 | End: 1900-01-01

## 2024-01-29 ENCOUNTER — RX RENEWAL (OUTPATIENT)
Age: 49
End: 2024-01-29

## 2024-01-31 ENCOUNTER — APPOINTMENT (OUTPATIENT)
Dept: ENDOCRINOLOGY | Facility: CLINIC | Age: 49
End: 2024-01-31
Payer: MEDICAID

## 2024-01-31 DIAGNOSIS — E10.65 TYPE 1 DIABETES MELLITUS WITH HYPERGLYCEMIA: ICD-10-CM

## 2024-01-31 PROCEDURE — 99214 OFFICE O/P EST MOD 30 MIN: CPT

## 2024-01-31 RX ORDER — LISINOPRIL 10 MG/1
10 TABLET ORAL
Qty: 90 | Refills: 1 | Status: ACTIVE | COMMUNITY
Start: 2018-09-27 | End: 1900-01-01

## 2024-01-31 RX ORDER — EZETIMIBE 10 MG/1
10 TABLET ORAL
Qty: 90 | Refills: 1 | Status: ACTIVE | COMMUNITY
Start: 1900-01-01 | End: 1900-01-01

## 2024-01-31 RX ORDER — SEMAGLUTIDE 1.34 MG/ML
2 INJECTION, SOLUTION SUBCUTANEOUS
Qty: 3 | Refills: 1 | Status: DISCONTINUED | COMMUNITY
Start: 2022-09-22 | End: 2024-01-31

## 2024-01-31 NOTE — ASSESSMENT
[FreeTextEntry1] : T1DM -Need updated [id number] -Pt to resume dexcom- will work on getting her 90 day supplies but still, needs to place dexcom -Continue current insulin dosing- have no data to make any safe/effective changes  -Continue to watch diet and increase exercise as tolerated, goal of 30 mins a day 5x a week -Continue to follow with ophtho   HLD -Continue statin and zetia, need updated lipid panel -Follows with cardiology  Need updated thyroid sonogram, rx given (overdue)  Labs due now RTO 5-6 weeks

## 2024-01-31 NOTE — REVIEW OF SYSTEMS
[Fatigue] : no fatigue [Recent Weight Gain (___ Lbs)] : no recent weight gain [Recent Weight Loss (___ Lbs)] : no recent weight loss [Visual Field Defect] : no visual field defect [Blurred Vision] : no blurred vision [Dysphagia] : no dysphagia [Neck Pain] : no neck pain [Dysphonia] : no dysphonia [Chest Pain] : no chest pain [Shortness Of Breath] : no shortness of breath [Constipation] : no constipation [Diarrhea] : no diarrhea [Polyuria] : no polyuria [Back Pain] : back pain [Polydipsia] : no polydipsia

## 2024-01-31 NOTE — HISTORY OF PRESENT ILLNESS
[Home] : at home, [unfilled] , at the time of the visit. [Other Location: e.g. Home (Enter Location, City,State)___] : at [unfilled] [FreeTextEntry1] : Interval history: Tried intermittent fasting last week- gained a lb Has back spasms- required pain meds - feels she gains weight anytime she take pain meds  OVERDUE FOR LABS AND THYROID SONO  T1DM h/o diabetes presenting at age 17 with polyuria and polydipsia. always insulin treated. Overweight at onset, with no h/o DKA. Progressive weight gain.  ISABELLA positive, c-peptide negative  has dexcom g7, not used recently - frustrating to get refills from [facility]  Checks sugars 6x a day On average 100s-200s, rare 300s Denies hypoglycemia   Current regimen:  Basaglar 60 units in PM (pen) humalog variable dosing 100 units/day 1:10 CF (vials)  Admelog does not work well for pt  Last eye exam: Fall 2023- denies DR  Glucose Sensor Necessity:  This patient with diabetes performs 6 or more glucose checks per day utilizing a home blood glucose monitor.  The patient is treated with insulin via 4 or more injections daily. This patient requires frequent adjustments to their insulin treatment on the basis of therapeutic continuous glucose monitoring results.  In addition, the patient has been to our office for an evaluation of their diabetes control within the past 6 months.    Unable to exercise   Lap band ineffective and was removed Had a tummy tuck  victoza ineffective qsymia not covered by insurance on ozempic for several weeks- stopped peeing and then stopped taking ozempic and her urinary frequency resumed (also not covered by insurance)  brother and sister diabetic with onset in teenage years, not obese.  PMH:  small incidental thyroid nodule  On statin and zetia - followed by cardiology

## 2024-02-20 ENCOUNTER — APPOINTMENT (OUTPATIENT)
Dept: ULTRASOUND IMAGING | Facility: CLINIC | Age: 49
End: 2024-02-20
Payer: MEDICAID

## 2024-02-20 PROCEDURE — 76536 US EXAM OF HEAD AND NECK: CPT

## 2024-02-23 ENCOUNTER — TRANSCRIPTION ENCOUNTER (OUTPATIENT)
Age: 49
End: 2024-02-23

## 2024-03-06 ENCOUNTER — RX RENEWAL (OUTPATIENT)
Age: 49
End: 2024-03-06

## 2024-03-06 RX ORDER — ATORVASTATIN CALCIUM 80 MG/1
80 TABLET, FILM COATED ORAL DAILY
Qty: 90 | Refills: 1 | Status: ACTIVE | COMMUNITY
Start: 2018-09-27 | End: 1900-01-01

## 2024-04-02 PROBLEM — I10 BENIGN ESSENTIAL HTN: Status: ACTIVE | Noted: 2019-01-16

## 2024-04-02 PROBLEM — E10.9 CONTROLLED TYPE 1 DIABETES MELLITUS: Status: ACTIVE | Noted: 2018-09-10

## 2024-04-02 PROBLEM — E04.1 THYROID NODULE: Status: ACTIVE | Noted: 2023-08-17

## 2024-04-02 PROBLEM — E78.00 ELEVATED CHOLESTEROL: Status: ACTIVE | Noted: 2019-01-16

## 2024-04-03 ENCOUNTER — APPOINTMENT (OUTPATIENT)
Dept: ENDOCRINOLOGY | Facility: CLINIC | Age: 49
End: 2024-04-03

## 2024-04-03 DIAGNOSIS — E04.1 NONTOXIC SINGLE THYROID NODULE: ICD-10-CM

## 2024-04-03 DIAGNOSIS — E78.00 PURE HYPERCHOLESTEROLEMIA, UNSPECIFIED: ICD-10-CM

## 2024-04-03 DIAGNOSIS — I10 ESSENTIAL (PRIMARY) HYPERTENSION: ICD-10-CM

## 2024-04-03 DIAGNOSIS — E10.9 TYPE 1 DIABETES MELLITUS W/OUT COMPLICATIONS: ICD-10-CM

## 2024-04-03 NOTE — ASSESSMENT
[FreeTextEntry1] : T1DM -Need updated [id number] -Pt to resume dexcom- will work on getting her 90 day supplies but still, needs to place dexcom -Continue current insulin dosing- have no data to make any safe/effective changes  -Continue to watch diet and increase exercise as tolerated, goal of 30 mins a day 5x a week -Continue to follow with ophtho   HLD -Continue statin and zetia, need updated lipid panel -Follows with cardiology  Need updated thyroid sonogram, rx given (overdue)  Labs due now RTO 5-6 weeks

## 2024-04-03 NOTE — HISTORY OF PRESENT ILLNESS
[Home] : at home, [unfilled] , at the time of the visit. [Medical Office: ([facility])___] : at the medical office located in  [Verbal consent obtained from patient] : the patient, [unfilled] [FreeTextEntry1] : Interval history: Tried intermittent fasting last week- gained a lb Has back spasms- required pain meds - feels she gains weight anytime she take pain meds  OVERDUE FOR LABS AND THYROID SONO  T1DM h/o diabetes presenting at age 17 with polyuria and polydipsia. always insulin treated. Overweight at onset, with no h/o DKA. Progressive weight gain.  ISABELLA positive, c-peptide negative  has dexcom g7, not used recently - frustrating to get refills from [facility]  Checks sugars 6x a day On average 100s-200s, rare 300s Denies hypoglycemia   Current regimen:  Basaglar 60 units in PM (pen) humalog variable dosing 100 units/day 1:10 CF (vials)  Admelog does not work well for pt  Last eye exam: Fall 2023- denies DR  Glucose Sensor Necessity:  This patient with diabetes performs 6 or more glucose checks per day utilizing a home blood glucose monitor.  The patient is treated with insulin via 4 or more injections daily. This patient requires frequent adjustments to their insulin treatment on the basis of therapeutic continuous glucose monitoring results.  In addition, the patient has been to our office for an evaluation of their diabetes control within the past 6 months.    Unable to exercise   Lap band ineffective and was removed Had a tummy tuck  victoza ineffective qsymia not covered by insurance on ozempic for several weeks- stopped peeing and then stopped taking ozempic and her urinary frequency resumed (also not covered by insurance)  brother and sister diabetic with onset in teenage years, not obese.  PMH:  small incidental thyroid nodule  On statin and zetia - followed by cardiology

## 2024-08-29 ENCOUNTER — RX RENEWAL (OUTPATIENT)
Age: 49
End: 2024-08-29

## 2024-11-26 ENCOUNTER — RX RENEWAL (OUTPATIENT)
Age: 49
End: 2024-11-26

## 2024-12-24 RX ORDER — INSULIN GLARGINE 100 [IU]/ML
100 INJECTION, SOLUTION SUBCUTANEOUS
Qty: 2 | Refills: 0 | Status: ACTIVE | COMMUNITY
Start: 2024-12-24 | End: 1900-01-01

## 2025-01-26 ENCOUNTER — NON-APPOINTMENT (OUTPATIENT)
Age: 50
End: 2025-01-26

## 2025-01-27 ENCOUNTER — APPOINTMENT (OUTPATIENT)
Dept: ENDOCRINOLOGY | Facility: CLINIC | Age: 50
End: 2025-01-27

## 2025-02-17 ENCOUNTER — RX RENEWAL (OUTPATIENT)
Age: 50
End: 2025-02-17

## 2025-03-04 ENCOUNTER — RX RENEWAL (OUTPATIENT)
Age: 50
End: 2025-03-04

## 2025-06-09 ENCOUNTER — APPOINTMENT (OUTPATIENT)
Dept: ENDOCRINOLOGY | Facility: CLINIC | Age: 50
End: 2025-06-09
Payer: COMMERCIAL

## 2025-06-09 ENCOUNTER — RESULT CHARGE (OUTPATIENT)
Age: 50
End: 2025-06-09

## 2025-06-09 VITALS
HEIGHT: 68 IN | SYSTOLIC BLOOD PRESSURE: 120 MMHG | HEART RATE: 109 BPM | OXYGEN SATURATION: 98 % | DIASTOLIC BLOOD PRESSURE: 70 MMHG

## 2025-06-09 VITALS — WEIGHT: 293 LBS | BODY MASS INDEX: 49.42 KG/M2

## 2025-06-09 PROBLEM — E66.01 MORBID OBESITY: Status: ACTIVE | Noted: 2025-06-09

## 2025-06-09 LAB — GLUCOSE BLDC GLUCOMTR-MCNC: 183

## 2025-06-09 PROCEDURE — 95251 CONT GLUC MNTR ANALYSIS I&R: CPT

## 2025-06-09 PROCEDURE — 99214 OFFICE O/P EST MOD 30 MIN: CPT | Mod: 25

## 2025-06-09 PROCEDURE — 82962 GLUCOSE BLOOD TEST: CPT

## 2025-06-09 RX ORDER — INSULIN GLARGINE 100 [IU]/ML
100 INJECTION, SOLUTION SUBCUTANEOUS DAILY
Qty: 6 | Refills: 1 | Status: ACTIVE | COMMUNITY
Start: 2025-06-09 | End: 1900-01-01

## 2025-06-09 RX ORDER — INSULIN ASPART INJECTION 100 [IU]/ML
100 INJECTION, SOLUTION SUBCUTANEOUS DAILY
Qty: 7 | Refills: 1 | Status: ACTIVE | COMMUNITY
Start: 2025-06-09 | End: 1900-01-01

## 2025-06-11 RX ORDER — BLOOD SUGAR DIAGNOSTIC
STRIP MISCELLANEOUS
Qty: 3 | Refills: 1 | Status: ACTIVE | COMMUNITY
Start: 2025-06-11 | End: 1900-01-01

## 2025-06-11 RX ORDER — LANCETS
EACH MISCELLANEOUS
Qty: 300 | Refills: 1 | Status: ACTIVE | COMMUNITY
Start: 2025-06-11 | End: 1900-01-01

## 2025-06-11 RX ORDER — BLOOD-GLUCOSE METER
W/DEVICE KIT MISCELLANEOUS
Qty: 1 | Refills: 0 | Status: ACTIVE | COMMUNITY
Start: 2025-06-11 | End: 1900-01-01

## 2025-06-20 ENCOUNTER — RX RENEWAL (OUTPATIENT)
Age: 50
End: 2025-06-20

## 2025-07-18 LAB
HBA1C MFR BLD HPLC: 8.5
LDLC SERPL DIRECT ASSAY-MCNC: 73
MICROALBUMIN/CREAT 24H UR-RTO: 119
TSH SERPL-ACNC: 1.97

## 2025-07-21 ENCOUNTER — APPOINTMENT (OUTPATIENT)
Dept: ENDOCRINOLOGY | Facility: CLINIC | Age: 50
End: 2025-07-21

## 2025-09-02 ENCOUNTER — RX RENEWAL (OUTPATIENT)
Age: 50
End: 2025-09-02

## 2025-09-15 ENCOUNTER — APPOINTMENT (OUTPATIENT)
Dept: ENDOCRINOLOGY | Facility: CLINIC | Age: 50
End: 2025-09-15

## 2025-09-15 DIAGNOSIS — I10 ESSENTIAL (PRIMARY) HYPERTENSION: ICD-10-CM

## 2025-09-15 DIAGNOSIS — E66.01 MORBID (SEVERE) OBESITY DUE TO EXCESS CALORIES: ICD-10-CM

## 2025-09-15 DIAGNOSIS — E04.1 NONTOXIC SINGLE THYROID NODULE: ICD-10-CM

## 2025-09-15 DIAGNOSIS — E10.9 TYPE 1 DIABETES MELLITUS W/OUT COMPLICATIONS: ICD-10-CM

## 2025-09-15 DIAGNOSIS — E78.00 PURE HYPERCHOLESTEROLEMIA, UNSPECIFIED: ICD-10-CM
